# Patient Record
Sex: MALE | Race: BLACK OR AFRICAN AMERICAN | NOT HISPANIC OR LATINO | URBAN - METROPOLITAN AREA
[De-identification: names, ages, dates, MRNs, and addresses within clinical notes are randomized per-mention and may not be internally consistent; named-entity substitution may affect disease eponyms.]

---

## 2019-02-22 ENCOUNTER — IMPORTED ENCOUNTER (OUTPATIENT)
Dept: URBAN - METROPOLITAN AREA CLINIC 1 | Facility: CLINIC | Age: 64
End: 2019-02-22

## 2019-02-22 PROBLEM — H40.023: Noted: 2019-02-22

## 2019-02-22 PROBLEM — H25.813: Noted: 2019-02-22

## 2019-02-22 PROBLEM — H11.153: Noted: 2019-02-22

## 2019-02-22 PROCEDURE — 92133 CPTRZD OPH DX IMG PST SGM ON: CPT

## 2019-02-22 PROCEDURE — 92014 COMPRE OPH EXAM EST PT 1/>: CPT

## 2019-02-22 NOTE — PATIENT DISCUSSION
1.  Glaucoma Suspect OU (CD 0.70/0.50): No progression by OCT. IOP stable. Strong family hx (mother sister first cousin blindness). Patient is considered high risk. Condition was discussed with patient and patient understands. Will continue to monitor patient for any progression in condition. Patient was advised to call us with any problems questions or concerns. 2.  Cataract OU: Observe for now without intervention. The patient was advised to contact us if any change or worsening of vision3. Pinguecula OU -- Use of sunglasses when exposed to UV light and observation is recommended. 4. H/o [place] [address]anc[address] OUReturn for an appointment in PRN 36 with Dr. Jam Cox. Return for an appointment in 1 year 30/OCT/HVF with Dr. Jam Cox.

## 2019-02-25 ENCOUNTER — IMPORTED ENCOUNTER (OUTPATIENT)
Dept: URBAN - METROPOLITAN AREA CLINIC 1 | Facility: CLINIC | Age: 64
End: 2019-02-25

## 2019-02-25 PROBLEM — H52.4: Noted: 2019-02-25

## 2019-02-25 PROCEDURE — S0621 ROUTINE OPHTHALMOLOGICAL EXA: HCPCS

## 2019-02-25 NOTE — PATIENT DISCUSSION
1.  Glaucoma Suspect OU (CD 0.70/0.50):  2. Cataract OU: Observe 3. Pinguecula OU -- Use of sunglasses when exposed to UV light and observation is recommended. 4.   H/o [address] OU

## 2019-02-25 NOTE — PATIENT DISCUSSION
1.  Presbyopia: Rx was given for corrective spectacles if indicated. 2.  Glaucoma Suspect OU (CD 0.70/0.50):  3. Cataract OU: Observe 4. Pinguecula OU -- Use of sunglasses when exposed to UV light and observation is recommended. 5. H/o PRK OU6. Return for an appointment in 1 year for 40. with Dr. Luly Peraza.

## 2020-02-18 ENCOUNTER — IMPORTED ENCOUNTER (OUTPATIENT)
Dept: URBAN - METROPOLITAN AREA CLINIC 1 | Facility: CLINIC | Age: 65
End: 2020-02-18

## 2020-02-18 PROBLEM — H40.023: Noted: 2020-02-18

## 2020-02-18 PROBLEM — H25.813: Noted: 2020-02-18

## 2020-02-18 PROBLEM — H11.153: Noted: 2020-02-18

## 2020-02-18 PROCEDURE — 92014 COMPRE OPH EXAM EST PT 1/>: CPT

## 2020-02-18 PROCEDURE — 92083 EXTENDED VISUAL FIELD XM: CPT

## 2020-02-18 PROCEDURE — 92133 CPTRZD OPH DX IMG PST SGM ON: CPT

## 2020-02-18 NOTE — PATIENT DISCUSSION
1.  Glaucoma Suspect OU -- (CD 0.70/0.60) No progression by OCT. HVF 24-2 WNL OU. IOP stable 19 OU today. Strong family hx (mother sister first cousin blindness). Patient is considered high risk. Condition was discussed with patient and patient understands. Will continue to monitor patient for any progression in condition. Patient was advised to call us with any problems questions or concerns. 2.  Cataract OU -- Observe for now without intervention. The patient was advised to contact us if any change or worsening of vision3. Pinguecula OU -- Use of sunglasses when exposed to UV light and observation is recommended. 4. H/o [address] OUPatient defers MRx today. Will return under vision plan. Return for an appointment in PRN 40 with Dr. Christine Castillo. Return for an appointment in 1 year 30/OCT with Dr. Christine Castillo.

## 2021-02-24 ENCOUNTER — IMPORTED ENCOUNTER (OUTPATIENT)
Dept: URBAN - METROPOLITAN AREA CLINIC 1 | Facility: CLINIC | Age: 66
End: 2021-02-24

## 2021-02-24 PROBLEM — H40.023: Noted: 2021-02-24

## 2021-02-24 PROBLEM — H11.153: Noted: 2021-02-24

## 2021-02-24 PROBLEM — H25.813: Noted: 2021-02-24

## 2021-02-24 PROCEDURE — 92014 COMPRE OPH EXAM EST PT 1/>: CPT

## 2021-02-24 PROCEDURE — 92133 CPTRZD OPH DX IMG PST SGM ON: CPT

## 2021-02-24 NOTE — PATIENT DISCUSSION
1.  Glaucoma Suspect OU -- (C/D: 0.70/0.60) OCT shows no progression OU stable. IOP stable at 18/17. T-Max 20/19. () Strong Family Hx (Mother Sister & first cousin blind). Patient is considered high risk. Condition was discussed with patient and patient understands. Will continue to monitor patient for any progression in condition. Patient was advised to call us with any problems questions or concerns. 2.  Cataract OU -- Mild progression OU. Non-surgical at this time continue to monitor for progression. The patient was advised to contact us if any change or worsening of vision3. Pingeuecula OU -- Recommended wearing sunglasses when exposed to UV light. 4.  S/p PRK OUPatient deferred MRx at today's visit. Return for an appointment in 1 year 30/OCT with Dr. Darryl Almazan.

## 2021-03-24 ENCOUNTER — PREPPED CHART (OUTPATIENT)
Dept: URBAN - METROPOLITAN AREA CLINIC 1 | Facility: CLINIC | Age: 66
End: 2021-03-24

## 2021-03-24 ENCOUNTER — IMPORTED ENCOUNTER (OUTPATIENT)
Dept: URBAN - METROPOLITAN AREA CLINIC 1 | Facility: CLINIC | Age: 66
End: 2021-03-24

## 2021-03-24 PROBLEM — H52.4: Noted: 2021-03-24

## 2021-03-24 PROBLEM — H52.03: Noted: 2021-03-24

## 2021-03-24 PROBLEM — H52.223: Noted: 2021-03-24

## 2021-03-24 PROCEDURE — S0621 ROUTINE OPHTHALMOLOGICAL EXA: HCPCS

## 2021-03-24 NOTE — PATIENT DISCUSSION
1.  Hyperopia- Rx was given for corrective spectacles if indicated. 2.  Astigmatism OU3. Presbyopia4. Cataract OU- Observe5. Pingeuecula OU- Recommended wearing sunglasses when exposed to UV light. 6.  Glaucoma Suspect OU -- (C/D: 0.70/0.60) IOP stable at 18/17. T-Max 20/19. () Strong Family Hx (Mother Sister & first cousin blind). Patient is considered high risk. Condition was discussed with patient and patient understands. Will continue to monitor patient for any progression in condition. Patient was advised to call us with any problems questions or concerns. 7.  S/p PRK OUReturn for an appointment in Feb 30/OCT with Dr. Flores Melgar. Return for an appointment in 1 year 36 with Dr. Flores Melgar.

## 2022-02-23 ASSESSMENT — VISUAL ACUITY
OD_SC: 20/30
OS_CC: 20/30
OS_SC: 20/30
OD_CC: J1
OS_CC: J1
OD_CC: 20/30

## 2022-02-23 ASSESSMENT — TONOMETRY
OD_IOP_MMHG: 18
OS_IOP_MMHG: 18

## 2022-04-02 ASSESSMENT — VISUAL ACUITY
OS_CC: J1
OS_CC: 20/20
OS_CC: 20/20
OD_SC: 20/20
OS_SC: 20/20
OD_CC: J1+
OD_SC: 20/20
OD_SC: 20/30
OS_CC: 20/30
OS_SC: 20/30
OD_CC: 20/20
OS_CC: J1+
OD_CC: 20/20
OD_CC: J1+
OS_CC: J1+
OD_CC: 20/20
OS_CC: J1+
OS_CC: 20/20
OD_SC: 20/20
OS_SC: 20/20
OS_SC: 20/20
OD_CC: J1+
OS_CC: J1+
OS_SC: 20/20
OD_SC: 20/20
OD_CC: 20/30
OD_CC: J1
OD_CC: J1+

## 2022-04-02 ASSESSMENT — KERATOMETRY
OD_K2POWER_DIOPTERS: 42.00
OS_K1POWER_DIOPTERS: 41.25
OS_AXISANGLE_DEGREES: 082
OD_AXISANGLE2_DEGREES: 179
OD_K1POWER_DIOPTERS: 41.25
OS_AXISANGLE2_DEGREES: 172
OS_K2POWER_DIOPTERS: 42.00
OD_AXISANGLE_DEGREES: 089

## 2022-04-02 ASSESSMENT — TONOMETRY
OD_IOP_MMHG: 19
OS_IOP_MMHG: 17
OD_IOP_MMHG: 18
OD_IOP_MMHG: 18
OS_IOP_MMHG: 16
OD_IOP_MMHG: 18
OD_IOP_MMHG: 16
OS_IOP_MMHG: 19
OS_IOP_MMHG: 17
OS_IOP_MMHG: 18

## 2022-04-13 ENCOUNTER — ESTABLISHED PATIENT (OUTPATIENT)
Dept: URBAN - METROPOLITAN AREA CLINIC 1 | Facility: CLINIC | Age: 67
End: 2022-04-13

## 2022-04-13 DIAGNOSIS — H40.023: ICD-10-CM

## 2022-04-13 DIAGNOSIS — Z98.890: ICD-10-CM

## 2022-04-13 DIAGNOSIS — H25.813: ICD-10-CM

## 2022-04-13 PROCEDURE — 92014 COMPRE OPH EXAM EST PT 1/>: CPT

## 2022-04-13 PROCEDURE — 92015 DETERMINE REFRACTIVE STATE: CPT

## 2022-04-13 PROCEDURE — 92133 CPTRZD OPH DX IMG PST SGM ON: CPT

## 2022-04-13 ASSESSMENT — VISUAL ACUITY
OD_CC: J1+
OS_CC: 20/20
OS_CC: J2
OD_CC: 20/20

## 2022-04-13 ASSESSMENT — TONOMETRY
OS_IOP_MMHG: 18
OD_IOP_MMHG: 24

## 2022-04-13 NOTE — PATIENT DISCUSSION
(CD 0.75/0.6) IOP elevated 24/18. Strong (+) family Hx (mother, siblings). OCT today WNL OD, thinning OS however poor reliability.  Will cont to observe as high risk. Consider gtts with any progression.

## 2023-04-19 ENCOUNTER — FOLLOW UP (OUTPATIENT)
Dept: URBAN - METROPOLITAN AREA CLINIC 1 | Facility: CLINIC | Age: 68
End: 2023-04-19

## 2023-04-19 DIAGNOSIS — H40.023: ICD-10-CM

## 2023-04-19 DIAGNOSIS — H25.813: ICD-10-CM

## 2023-04-19 PROCEDURE — 92083 EXTENDED VISUAL FIELD XM: CPT

## 2023-04-19 PROCEDURE — 99214 OFFICE O/P EST MOD 30 MIN: CPT

## 2023-04-19 PROCEDURE — 92015 DETERMINE REFRACTIVE STATE: CPT

## 2023-04-19 ASSESSMENT — VISUAL ACUITY
OD_CC: J1
OS_CC: J1
OD_CC: 20/25-1
OS_CC: 20/25-1

## 2023-04-19 ASSESSMENT — TONOMETRY
OS_IOP_MMHG: 22
OD_IOP_MMHG: 22

## 2023-10-18 ENCOUNTER — FOLLOW UP (OUTPATIENT)
Dept: URBAN - METROPOLITAN AREA CLINIC 1 | Facility: CLINIC | Age: 68
End: 2023-10-18

## 2023-10-18 DIAGNOSIS — H40.023: ICD-10-CM

## 2023-10-18 PROCEDURE — 92133 CPTRZD OPH DX IMG PST SGM ON: CPT

## 2023-10-18 PROCEDURE — 99213 OFFICE O/P EST LOW 20 MIN: CPT

## 2023-10-18 ASSESSMENT — VISUAL ACUITY
OD_CC: 20/20
OS_CC: 20/20
OS_CC: J1+
OD_CC: J1+

## 2023-10-18 ASSESSMENT — TONOMETRY
OS_IOP_MMHG: 21
OD_IOP_MMHG: 21

## 2024-09-19 ENCOUNTER — COMPREHENSIVE EXAM (OUTPATIENT)
Dept: URBAN - METROPOLITAN AREA CLINIC 1 | Facility: CLINIC | Age: 69
End: 2024-09-19

## 2024-09-19 DIAGNOSIS — H52.223: ICD-10-CM

## 2024-09-19 DIAGNOSIS — H25.813: ICD-10-CM

## 2024-09-19 DIAGNOSIS — H40.023: ICD-10-CM

## 2024-09-19 DIAGNOSIS — Z98.890: ICD-10-CM

## 2024-09-19 DIAGNOSIS — H11.153: ICD-10-CM

## 2024-09-19 PROCEDURE — 92083 EXTENDED VISUAL FIELD XM: CPT

## 2024-09-19 PROCEDURE — 92015 DETERMINE REFRACTIVE STATE: CPT

## 2024-09-19 PROCEDURE — 99214 OFFICE O/P EST MOD 30 MIN: CPT

## 2024-11-07 ENCOUNTER — COMPREHENSIVE EXAM (OUTPATIENT)
Dept: URBAN - METROPOLITAN AREA CLINIC 1 | Facility: CLINIC | Age: 69
End: 2024-11-07

## 2024-11-07 DIAGNOSIS — H52.03: ICD-10-CM

## 2024-11-07 DIAGNOSIS — Z01.00: ICD-10-CM

## 2024-11-07 DIAGNOSIS — H52.4: ICD-10-CM

## 2024-11-07 DIAGNOSIS — H52.223: ICD-10-CM

## 2024-11-07 PROCEDURE — 92015 DETERMINE REFRACTIVE STATE: CPT

## 2024-11-07 PROCEDURE — 92014 COMPRE OPH EXAM EST PT 1/>: CPT

## 2025-03-25 ENCOUNTER — FOLLOW UP (OUTPATIENT)
Age: 70
End: 2025-03-25

## 2025-03-25 DIAGNOSIS — H40.1131: ICD-10-CM

## 2025-03-25 PROCEDURE — 92133 CPTRZD OPH DX IMG PST SGM ON: CPT

## 2025-03-25 PROCEDURE — 99213 OFFICE O/P EST LOW 20 MIN: CPT

## 2025-04-25 ENCOUNTER — FOLLOW UP (OUTPATIENT)
Age: 70
End: 2025-04-25

## 2025-04-25 DIAGNOSIS — H40.1131: ICD-10-CM

## 2025-04-25 PROCEDURE — 99213 OFFICE O/P EST LOW 20 MIN: CPT
